# Patient Record
Sex: FEMALE | Race: ASIAN | NOT HISPANIC OR LATINO
[De-identification: names, ages, dates, MRNs, and addresses within clinical notes are randomized per-mention and may not be internally consistent; named-entity substitution may affect disease eponyms.]

---

## 2018-09-01 PROBLEM — Z00.00 ENCOUNTER FOR PREVENTIVE HEALTH EXAMINATION: Status: ACTIVE | Noted: 2018-09-01

## 2018-09-04 ENCOUNTER — APPOINTMENT (OUTPATIENT)
Dept: ANTEPARTUM | Facility: CLINIC | Age: 33
End: 2018-09-04
Payer: MEDICAID

## 2018-09-04 ENCOUNTER — ASOB RESULT (OUTPATIENT)
Age: 33
End: 2018-09-04

## 2018-09-04 PROCEDURE — 76805 OB US >/= 14 WKS SNGL FETUS: CPT

## 2018-09-04 PROCEDURE — 76801 OB US < 14 WKS SINGLE FETUS: CPT

## 2018-10-16 ENCOUNTER — ASOB RESULT (OUTPATIENT)
Age: 33
End: 2018-10-16

## 2018-10-16 ENCOUNTER — APPOINTMENT (OUTPATIENT)
Dept: ANTEPARTUM | Facility: CLINIC | Age: 33
End: 2018-10-16
Payer: MEDICAID

## 2018-10-16 PROCEDURE — 76811 OB US DETAILED SNGL FETUS: CPT

## 2018-10-16 PROCEDURE — 76817 TRANSVAGINAL US OBSTETRIC: CPT

## 2018-12-11 ENCOUNTER — APPOINTMENT (OUTPATIENT)
Dept: ANTEPARTUM | Facility: CLINIC | Age: 33
End: 2018-12-11
Payer: MEDICAID

## 2018-12-11 ENCOUNTER — ASOB RESULT (OUTPATIENT)
Age: 33
End: 2018-12-11

## 2018-12-11 PROCEDURE — 76819 FETAL BIOPHYS PROFIL W/O NST: CPT

## 2018-12-11 PROCEDURE — 76816 OB US FOLLOW-UP PER FETUS: CPT

## 2019-01-22 ENCOUNTER — ASOB RESULT (OUTPATIENT)
Age: 34
End: 2019-01-22

## 2019-01-22 ENCOUNTER — APPOINTMENT (OUTPATIENT)
Dept: ANTEPARTUM | Facility: CLINIC | Age: 34
End: 2019-01-22
Payer: MEDICAID

## 2019-01-22 PROCEDURE — 76819 FETAL BIOPHYS PROFIL W/O NST: CPT

## 2019-01-22 PROCEDURE — 76816 OB US FOLLOW-UP PER FETUS: CPT

## 2019-02-05 ENCOUNTER — TRANSCRIPTION ENCOUNTER (OUTPATIENT)
Age: 34
End: 2019-02-05

## 2019-02-25 ENCOUNTER — APPOINTMENT (OUTPATIENT)
Dept: ANTEPARTUM | Facility: CLINIC | Age: 34
End: 2019-02-25
Payer: MEDICAID

## 2019-02-25 ENCOUNTER — ASOB RESULT (OUTPATIENT)
Age: 34
End: 2019-02-25

## 2019-02-25 PROCEDURE — 76821 MIDDLE CEREBRAL ARTERY ECHO: CPT

## 2019-02-25 PROCEDURE — 76819 FETAL BIOPHYS PROFIL W/O NST: CPT

## 2019-02-25 PROCEDURE — 76816 OB US FOLLOW-UP PER FETUS: CPT

## 2019-02-25 PROCEDURE — 93976 VASCULAR STUDY: CPT

## 2019-02-25 PROCEDURE — 76820 UMBILICAL ARTERY ECHO: CPT

## 2019-02-27 ENCOUNTER — TRANSCRIPTION ENCOUNTER (OUTPATIENT)
Age: 34
End: 2019-02-27

## 2019-02-27 ENCOUNTER — INPATIENT (INPATIENT)
Facility: HOSPITAL | Age: 34
LOS: 1 days | Discharge: HOME | End: 2019-03-01
Attending: OBSTETRICS & GYNECOLOGY | Admitting: OBSTETRICS & GYNECOLOGY
Payer: COMMERCIAL

## 2019-02-27 VITALS
RESPIRATION RATE: 17 BRPM | TEMPERATURE: 98 F | HEART RATE: 83 BPM | SYSTOLIC BLOOD PRESSURE: 124 MMHG | DIASTOLIC BLOOD PRESSURE: 80 MMHG

## 2019-02-27 DIAGNOSIS — O47.1 FALSE LABOR AT OR AFTER 37 COMPLETED WEEKS OF GESTATION: ICD-10-CM

## 2019-02-27 DIAGNOSIS — O36.5930 MATERNAL CARE FOR OTHER KNOWN OR SUSPECTED POOR FETAL GROWTH, THIRD TRIMESTER, NOT APPLICABLE OR UNSPECIFIED: ICD-10-CM

## 2019-02-27 DIAGNOSIS — O26.893 OTHER SPECIFIED PREGNANCY RELATED CONDITIONS, THIRD TRIMESTER: ICD-10-CM

## 2019-02-27 LAB
ABO RH CONFIRMATION: SIGNIFICANT CHANGE UP
BASOPHILS # BLD AUTO: 0 K/UL — SIGNIFICANT CHANGE UP (ref 0–0.2)
BASOPHILS NFR BLD AUTO: 0.1 % — SIGNIFICANT CHANGE UP (ref 0–2)
BLD GP AB SCN SERPL QL: SIGNIFICANT CHANGE UP
EOSINOPHIL # BLD AUTO: 0.2 K/UL — SIGNIFICANT CHANGE UP (ref 0–0.5)
EOSINOPHIL NFR BLD AUTO: 1.8 % — SIGNIFICANT CHANGE UP (ref 0–6)
HCT VFR BLD CALC: 37.3 % — SIGNIFICANT CHANGE UP (ref 37–47)
HGB BLD-MCNC: 12 G/DL — SIGNIFICANT CHANGE UP (ref 12–16)
LYMPHOCYTES # BLD AUTO: 1.7 K/UL — SIGNIFICANT CHANGE UP (ref 1–4.8)
LYMPHOCYTES # BLD AUTO: 16.8 % — LOW (ref 20–55)
MCHC RBC-ENTMCNC: 26.8 PG — LOW (ref 27–31)
MCHC RBC-ENTMCNC: 32.2 G/DL — SIGNIFICANT CHANGE UP (ref 32–36)
MCV RBC AUTO: 83.4 FL — SIGNIFICANT CHANGE UP (ref 81–99)
MONOCYTES # BLD AUTO: 0.7 K/UL — SIGNIFICANT CHANGE UP (ref 0–0.8)
MONOCYTES NFR BLD AUTO: 7.2 % — SIGNIFICANT CHANGE UP (ref 3–10)
NEUTROPHILS # BLD AUTO: 7.4 K/UL — SIGNIFICANT CHANGE UP (ref 1.8–8)
NEUTROPHILS NFR BLD AUTO: 73.9 % — HIGH (ref 37–73)
PLATELET # BLD AUTO: 260 K/UL — SIGNIFICANT CHANGE UP (ref 150–400)
RBC # BLD: 4.47 M/UL — SIGNIFICANT CHANGE UP (ref 4.4–5.2)
RBC # FLD: 13.8 % — SIGNIFICANT CHANGE UP (ref 11–15.6)
TYPE + AB SCN PNL BLD: SIGNIFICANT CHANGE UP
WBC # BLD: 10.1 K/UL — SIGNIFICANT CHANGE UP (ref 4.8–10.8)
WBC # FLD AUTO: 10.1 K/UL — SIGNIFICANT CHANGE UP (ref 4.8–10.8)

## 2019-02-27 RX ORDER — DIPHENHYDRAMINE HCL 50 MG
25 CAPSULE ORAL EVERY 6 HOURS
Qty: 0 | Refills: 0 | Status: DISCONTINUED | OUTPATIENT
Start: 2019-02-27 | End: 2019-03-01

## 2019-02-27 RX ORDER — SODIUM CHLORIDE 9 MG/ML
1000 INJECTION, SOLUTION INTRAVENOUS ONCE
Qty: 0 | Refills: 0 | Status: COMPLETED | OUTPATIENT
Start: 2019-02-27 | End: 2019-02-27

## 2019-02-27 RX ORDER — SODIUM CHLORIDE 9 MG/ML
1000 INJECTION, SOLUTION INTRAVENOUS
Qty: 0 | Refills: 0 | Status: DISCONTINUED | OUTPATIENT
Start: 2019-02-27 | End: 2019-03-01

## 2019-02-27 RX ORDER — LANOLIN
1 OINTMENT (GRAM) TOPICAL EVERY 6 HOURS
Qty: 0 | Refills: 0 | Status: DISCONTINUED | OUTPATIENT
Start: 2019-02-27 | End: 2019-03-01

## 2019-02-27 RX ORDER — IBUPROFEN 200 MG
600 TABLET ORAL EVERY 6 HOURS
Qty: 0 | Refills: 0 | Status: DISCONTINUED | OUTPATIENT
Start: 2019-02-27 | End: 2019-03-01

## 2019-02-27 RX ORDER — OXYTOCIN 10 UNIT/ML
41.67 VIAL (ML) INJECTION
Qty: 20 | Refills: 0 | Status: DISCONTINUED | OUTPATIENT
Start: 2019-02-27 | End: 2019-03-01

## 2019-02-27 RX ORDER — MAGNESIUM HYDROXIDE 400 MG/1
30 TABLET, CHEWABLE ORAL
Qty: 0 | Refills: 0 | Status: DISCONTINUED | OUTPATIENT
Start: 2019-02-27 | End: 2019-03-01

## 2019-02-27 RX ORDER — ONDANSETRON 8 MG/1
4 TABLET, FILM COATED ORAL EVERY 6 HOURS
Qty: 0 | Refills: 0 | Status: DISCONTINUED | OUTPATIENT
Start: 2019-02-27 | End: 2019-02-27

## 2019-02-27 RX ORDER — SIMETHICONE 80 MG/1
80 TABLET, CHEWABLE ORAL EVERY 6 HOURS
Qty: 0 | Refills: 0 | Status: DISCONTINUED | OUTPATIENT
Start: 2019-02-27 | End: 2019-03-01

## 2019-02-27 RX ORDER — AER TRAVELER 0.5 G/1
1 SOLUTION RECTAL; TOPICAL EVERY 4 HOURS
Qty: 0 | Refills: 0 | Status: DISCONTINUED | OUTPATIENT
Start: 2019-02-27 | End: 2019-03-01

## 2019-02-27 RX ORDER — SODIUM CHLORIDE 9 MG/ML
1000 INJECTION, SOLUTION INTRAVENOUS ONCE
Qty: 0 | Refills: 0 | Status: DISCONTINUED | OUTPATIENT
Start: 2019-02-27 | End: 2019-03-01

## 2019-02-27 RX ORDER — OXYTOCIN 10 UNIT/ML
333.33 VIAL (ML) INJECTION
Qty: 20 | Refills: 0 | Status: COMPLETED | OUTPATIENT
Start: 2019-02-27

## 2019-02-27 RX ORDER — SODIUM CHLORIDE 9 MG/ML
3 INJECTION INTRAMUSCULAR; INTRAVENOUS; SUBCUTANEOUS EVERY 8 HOURS
Qty: 0 | Refills: 0 | Status: DISCONTINUED | OUTPATIENT
Start: 2019-02-27 | End: 2019-03-01

## 2019-02-27 RX ORDER — SODIUM CHLORIDE 9 MG/ML
1000 INJECTION, SOLUTION INTRAVENOUS
Qty: 0 | Refills: 0 | Status: DISCONTINUED | OUTPATIENT
Start: 2019-02-27 | End: 2019-02-27

## 2019-02-27 RX ORDER — CITRIC ACID/SODIUM CITRATE 300-500 MG
15 SOLUTION, ORAL ORAL EVERY 4 HOURS
Qty: 0 | Refills: 0 | Status: DISCONTINUED | OUTPATIENT
Start: 2019-02-27 | End: 2019-02-27

## 2019-02-27 RX ORDER — GLYCERIN ADULT
1 SUPPOSITORY, RECTAL RECTAL AT BEDTIME
Qty: 0 | Refills: 0 | Status: DISCONTINUED | OUTPATIENT
Start: 2019-02-27 | End: 2019-03-01

## 2019-02-27 RX ORDER — DOCUSATE SODIUM 100 MG
100 CAPSULE ORAL
Qty: 0 | Refills: 0 | Status: DISCONTINUED | OUTPATIENT
Start: 2019-02-27 | End: 2019-03-01

## 2019-02-27 RX ORDER — HYDROCORTISONE 1 %
1 OINTMENT (GRAM) TOPICAL EVERY 4 HOURS
Qty: 0 | Refills: 0 | Status: DISCONTINUED | OUTPATIENT
Start: 2019-02-27 | End: 2019-03-01

## 2019-02-27 RX ORDER — ACETAMINOPHEN 500 MG
650 TABLET ORAL EVERY 6 HOURS
Qty: 0 | Refills: 0 | Status: DISCONTINUED | OUTPATIENT
Start: 2019-02-27 | End: 2019-03-01

## 2019-02-27 RX ORDER — OXYCODONE AND ACETAMINOPHEN 5; 325 MG/1; MG/1
2 TABLET ORAL EVERY 6 HOURS
Qty: 0 | Refills: 0 | Status: DISCONTINUED | OUTPATIENT
Start: 2019-02-27 | End: 2019-03-01

## 2019-02-27 RX ORDER — TETANUS TOXOID, REDUCED DIPHTHERIA TOXOID AND ACELLULAR PERTUSSIS VACCINE, ADSORBED 5; 2.5; 8; 8; 2.5 [IU]/.5ML; [IU]/.5ML; UG/.5ML; UG/.5ML; UG/.5ML
0.5 SUSPENSION INTRAMUSCULAR ONCE
Qty: 0 | Refills: 0 | Status: DISCONTINUED | OUTPATIENT
Start: 2019-02-27 | End: 2019-03-01

## 2019-02-27 RX ORDER — DIBUCAINE 1 %
1 OINTMENT (GRAM) RECTAL EVERY 4 HOURS
Qty: 0 | Refills: 0 | Status: DISCONTINUED | OUTPATIENT
Start: 2019-02-27 | End: 2019-03-01

## 2019-02-27 RX ORDER — OXYTOCIN 10 UNIT/ML
333.33 VIAL (ML) INJECTION
Qty: 20 | Refills: 0 | Status: DISCONTINUED | OUTPATIENT
Start: 2019-02-27 | End: 2019-03-01

## 2019-02-27 RX ORDER — PRAMOXINE HYDROCHLORIDE 150 MG/15G
1 AEROSOL, FOAM RECTAL EVERY 4 HOURS
Qty: 0 | Refills: 0 | Status: DISCONTINUED | OUTPATIENT
Start: 2019-02-27 | End: 2019-03-01

## 2019-02-27 RX ADMIN — SODIUM CHLORIDE 2000 MILLILITER(S): 9 INJECTION, SOLUTION INTRAVENOUS at 06:50

## 2019-02-27 NOTE — DISCHARGE NOTE OB - ADDITIONAL INSTRUCTIONS
To follow up at [facility] in 4-6 weeks for post partum check up. Diet and activity as tolerated.  Take medication as indicated

## 2019-02-27 NOTE — CHART NOTE - NSCHARTNOTEFT_GEN_A_CORE
Pt is uncomfortable with contractions but does not request pain medication at this time  Vital Signs Last 24 Hrs  T(C): 36.6 (2019 06:39), Max: 36.6 (2019 06:38)  T(F): 97.9 (2019 06:39), Max: 97.9 (2019 06:39)  HR: 70 (2019 13:17) (70 - 83)  BP: 131/65 (2019 13:17) (112/76 - 131/65)  RR: 18 (2019 06:39) (17 - 18)    FETAL HEART RATE: FHT was cat1 before the AROM after the rupure of membranes pt had a prolonged deceleration that lasted 6min, FHT came back to baseline with resuscitative measures, including changing of maternal position, LR bolus, and oxygen mask     [place]: contractions every 3-4 min    CERVICAL EXAM: 6/80/-2, AROM, clear, vertex DEBO    PAIN SCALE (0-10): 6/10 with contractions    IMPRESSION: continue close monitoring       INTERVENTIONS: anticipate  at this time

## 2019-02-27 NOTE — DISCHARGE NOTE OB - PATIENT PORTAL LINK FT
You can access the [facility], offered by [facility], by registering with the following website: [email or web address]

## 2019-02-27 NOTE — OB PROVIDER IHI INDUCTION/AUGMENTATION NOTE - NS_CHECKALL_OBGYN_ALL_OB
Order was written/FHR was reviewed/Induction / Augmentation was discussed/H&P was completed/Contractions pattern was reviewed

## 2019-02-27 NOTE — DISCHARGE NOTE OB - HOSPITAL COURSE
32yo  s/p  day 2 at [id number]. Labor course was without any complications and delivery involved a 1st degree perineal laceration which was successfully repaired. Patient did well in recovery and was transferred to post partum floor. Post partum course was unremarkable. Patient to follow up at [facility] in 6 weeks for postpartum check up. Patient is in medically optimized condition to be discharged home.

## 2019-02-27 NOTE — DISCHARGE NOTE OB - OTHER SIGNIFICANT FINDINGS
Labs:                          11.0   13.6  )-----------( 233      ( 28 Feb 2019 07:25 )             33.8

## 2019-02-27 NOTE — DISCHARGE NOTE OB - PLAN OF CARE
Delivered To follow up at [facility] in 4-6 weeks for post partum check up. Diet and activity as tolerated.  Take medication as indicated

## 2019-02-27 NOTE — DISCHARGE NOTE OB - MATERIALS PROVIDED
Vaccinations/Breastfeeding Mother’s Support Group Information/Guide to Postpartum Care/[place]  Immunization Record/Shaken Baby Prevention Handout/Breastfeeding Guide and Packet/Birth Certificate Instructions/[facility] Hearing Screen Program/[facility]  Screening Program/Breastfeeding Log/Bottle Feeding Log/Back To Sleep Handout

## 2019-02-27 NOTE — OB PROVIDER DELIVERY SUMMARY - NSPROVIDERDELIVERYNOTE_OBGYN_ALL_OB_FT
patient with good maternal pushing effort. vertex delivered in LAURA position followed by atraumatic delivery of the shoulders. delayed cord clamping of 30 seconds was done and pitocin was started. cord gases were collected and immediate skin to skin was initiated. placenta was then delivered spontaneously intact. first degree perineal laceration noted and repaired with 2-0 chromic.  male infant  apgar 9/9

## 2019-02-27 NOTE — CHART NOTE - NSCHARTNOTEFT_GEN_A_CORE
Vital Signs Last 24 Hrs  T(F): 97.9 (27 Feb 2019 06:39), Max: 97.9 (27 Feb 2019 06:39)  HR: 78 (27 Feb 2019 11:25) (71 - 83)  BP: 112/76 (27 Feb 2019 11:25) (112/76 - 124/80)  RR: 18 (27 Feb 2019 06:39) (17 - 18)      FETAL HEART RATE   fetal heart rate reactive with moderate variability and irregular contractions     CERVICAL EXAM:  3/50/-2 on admission    PAIN SCALE (0-10): 5  status post 2 doses of cytotec 20mcg    PLAN:  continue cytotec induction

## 2019-02-27 NOTE — DISCHARGE NOTE OB - CARE PROVIDER_API CALL
Gee Coyle)  Obstetrics and Gynecology  [address] [place] [address]  Phone: [phone]  Fax: [phone]  Follow Up Time:

## 2019-02-27 NOTE — OB PROVIDER H&P - ASSESSMENT
32 y/o  @ 39+3 week    pt presented for elective induction    Pmhx- none  pshx- none  pobhx- 1 ft   Pgynhx- no std's  Social- neg x 3  nkda  meds- prenatal vitamins  fetal heart rate reactive with occasional contractions  cervix 3/50/-3  GBS negative    plan;  admit for labor induction due to IUGR as per mfmf

## 2019-02-27 NOTE — DISCHARGE NOTE OB - MEDICATION SUMMARY - MEDICATIONS TO TAKE
I will START or STAY ON the medications listed below when I get home from the hospital:    ibuprofen 600 mg oral tablet  -- 1 tab(s) by mouth every 6 hours, As needed, Moderate Pain (4 - 6)  -- Indication: For Pain control    acetaminophen 325 mg oral tablet  -- 2 tab(s) by mouth every 6 hours, As needed, Temp greater or equal to 38.5C (101.3F), Mild Pain (1 - 3)  -- Indication: For Mild Pain    Prenatal Multivitamins with Folic Acid 1 mg oral tablet  -- 1 tab(s) by mouth once a day  -- Indication: For Nutrition

## 2019-02-27 NOTE — DISCHARGE NOTE OB - CARE PLAN
Principal Discharge DX:	 (normal spontaneous vaginal delivery)  Goal:	Delivered  Assessment and plan of treatment:	To follow up at [facility] in 4-6 weeks for post partum check up. Diet and activity as tolerated.  Take medication as indicated

## 2019-02-28 ENCOUNTER — RESULT REVIEW (OUTPATIENT)
Age: 34
End: 2019-02-28

## 2019-02-28 LAB
BASOPHILS # BLD AUTO: 0 K/UL — SIGNIFICANT CHANGE UP (ref 0–0.2)
BASOPHILS NFR BLD AUTO: 0.1 % — SIGNIFICANT CHANGE UP (ref 0–2)
EOSINOPHIL # BLD AUTO: 0.2 K/UL — SIGNIFICANT CHANGE UP (ref 0–0.5)
EOSINOPHIL NFR BLD AUTO: 1.1 % — SIGNIFICANT CHANGE UP (ref 0–6)
HCT VFR BLD CALC: 33.8 % — LOW (ref 37–47)
HGB BLD-MCNC: 11 G/DL — LOW (ref 12–16)
LYMPHOCYTES # BLD AUTO: 2.8 K/UL — SIGNIFICANT CHANGE UP (ref 1–4.8)
LYMPHOCYTES # BLD AUTO: 20.4 % — SIGNIFICANT CHANGE UP (ref 20–55)
MCHC RBC-ENTMCNC: 27.4 PG — SIGNIFICANT CHANGE UP (ref 27–31)
MCHC RBC-ENTMCNC: 32.5 G/DL — SIGNIFICANT CHANGE UP (ref 32–36)
MCV RBC AUTO: 84.1 FL — SIGNIFICANT CHANGE UP (ref 81–99)
MONOCYTES # BLD AUTO: 0.8 K/UL — SIGNIFICANT CHANGE UP (ref 0–0.8)
MONOCYTES NFR BLD AUTO: 5.8 % — SIGNIFICANT CHANGE UP (ref 3–10)
NEUTROPHILS # BLD AUTO: 9.9 K/UL — HIGH (ref 1.8–8)
NEUTROPHILS NFR BLD AUTO: 72.3 % — SIGNIFICANT CHANGE UP (ref 37–73)
PLATELET # BLD AUTO: 233 K/UL — SIGNIFICANT CHANGE UP (ref 150–400)
RBC # BLD: 4.02 M/UL — LOW (ref 4.4–5.2)
RBC # FLD: 14 % — SIGNIFICANT CHANGE UP (ref 11–15.6)
T PALLIDUM AB TITR SER: NEGATIVE — SIGNIFICANT CHANGE UP
WBC # BLD: 13.6 K/UL — HIGH (ref 4.8–10.8)
WBC # FLD AUTO: 13.6 K/UL — HIGH (ref 4.8–10.8)

## 2019-02-28 PROCEDURE — 88302 TISSUE EXAM BY PATHOLOGIST: CPT | Mod: 26

## 2019-02-28 RX ORDER — SODIUM CHLORIDE 9 MG/ML
500 INJECTION, SOLUTION INTRAVENOUS ONCE
Qty: 0 | Refills: 0 | Status: DISCONTINUED | OUTPATIENT
Start: 2019-02-28 | End: 2019-03-01

## 2019-02-28 RX ORDER — CITRIC ACID/SODIUM CITRATE 300-500 MG
30 SOLUTION, ORAL ORAL ONCE
Qty: 0 | Refills: 0 | Status: COMPLETED | OUTPATIENT
Start: 2019-02-28 | End: 2019-02-28

## 2019-02-28 RX ADMIN — Medication 1 TABLET(S): at 18:02

## 2019-02-28 RX ADMIN — Medication 30 MILLILITER(S): at 11:30

## 2019-02-28 RX ADMIN — SODIUM CHLORIDE 100 MILLILITER(S): 9 INJECTION, SOLUTION INTRAVENOUS at 07:06

## 2019-02-28 RX ADMIN — Medication 600 MILLIGRAM(S): at 20:46

## 2019-02-28 RX ADMIN — Medication 600 MILLIGRAM(S): at 20:16

## 2019-02-28 NOTE — PROGRESS NOTE ADULT - SUBJECTIVE AND OBJECTIVE BOX
Postpartum Note Vaginal Delivery  Patient is a 34yo  s/p  day 1.  Pt is NPO for bilateral postparttum salpingectomy today.  Subjective:  No acute events overnight.   Patient still has slight vaginal bleeding which is decreasing in amount.   Urinating appropriately.   -BM/+flatus.    Physical exam:  Vital Signs Last 24 Hrs  T(C): 37.1 (2019 20:49), Max: 37.1 (2019 20:49)  T(F): 98.8 (2019 20:49), Max: 98.8 (2019 20:49)  HR: 91 (2019 20:49) (69 - 91)  BP: 113/71 (2019 20:49) (98/57 - 134/74)  RR: 20 (2019 20:49) (18 - 20)    Heart: RRR  Lungs: CTABL  Breast: non tender, not engorged   Abdomen: Soft, nontender, no distension, firm uterine fundus  Ext: No DVT signs, warm extremities    LABS:                        12.0   10.1  )-----------( 260      ( 2019 07:31 )             37.3

## 2019-03-01 VITALS
SYSTOLIC BLOOD PRESSURE: 126 MMHG | TEMPERATURE: 98 F | HEART RATE: 76 BPM | RESPIRATION RATE: 18 BRPM | DIASTOLIC BLOOD PRESSURE: 84 MMHG

## 2019-03-01 PROCEDURE — 59050 FETAL MONITOR W/REPORT: CPT

## 2019-03-01 PROCEDURE — 85027 COMPLETE CBC AUTOMATED: CPT

## 2019-03-01 PROCEDURE — 86900 BLOOD TYPING SEROLOGIC ABO: CPT

## 2019-03-01 PROCEDURE — 86901 BLOOD TYPING SEROLOGIC RH(D): CPT

## 2019-03-01 PROCEDURE — 93970 EXTREMITY STUDY: CPT | Mod: 26

## 2019-03-01 PROCEDURE — 59025 FETAL NON-STRESS TEST: CPT

## 2019-03-01 PROCEDURE — G0463: CPT

## 2019-03-01 PROCEDURE — 36415 COLL VENOUS BLD VENIPUNCTURE: CPT

## 2019-03-01 PROCEDURE — 86850 RBC ANTIBODY SCREEN: CPT

## 2019-03-01 PROCEDURE — 93970 EXTREMITY STUDY: CPT

## 2019-03-01 PROCEDURE — 88302 TISSUE EXAM BY PATHOLOGIST: CPT

## 2019-03-01 PROCEDURE — 86780 TREPONEMA PALLIDUM: CPT

## 2019-03-01 RX ORDER — ACETAMINOPHEN 500 MG
2 TABLET ORAL
Qty: 0 | Refills: 0 | COMMUNITY
Start: 2019-03-01

## 2019-03-01 RX ORDER — IBUPROFEN 200 MG
1 TABLET ORAL
Qty: 120 | Refills: 0 | OUTPATIENT
Start: 2019-03-01 | End: 2019-03-30

## 2019-03-01 NOTE — PROGRESS NOTE ADULT - SUBJECTIVE AND OBJECTIVE BOX
INTERVAL HPI/OVERNIGHT EVENTS:  33y Female s/p Post Partum B/L Salpinjectomy under spinal anesthesia on 02/28/19    Vital Signs Last 24 Hrs  T(C): 36.8 (28 Feb 2019 20:36), Max: 37.2 (28 Feb 2019 13:09)  T(F): 98.2 (28 Feb 2019 20:36), Max: 98.9 (28 Feb 2019 13:09)  HR: 61 (28 Feb 2019 20:36) (60 - 69)  BP: 118/73 (28 Feb 2019 20:36) (101/66 - 126/76)  BP(mean): --  RR: 18 (28 Feb 2019 20:36) (16 - 20)  SpO2: 99% (28 Feb 2019 15:24) (98% - 99%)    Patient seen, doing well, no anesthetic complications or complaints noted or reported.  Pain is controlled.

## 2019-03-01 NOTE — PROGRESS NOTE ADULT - ASSESSMENT
A/P:  Patient is a 34yo  now PPD#2 s/p spontaneous vaginal delivery  -Stable  -Voiding, tolerating PO, bowel function nml   -Advance care as tolerated   -Continue routine postpartum/postoperative care and education.  -Pt encouraged to increase ambulation and PO intake  -D/C day 2 if meeting all expected milestones

## 2019-03-01 NOTE — PROGRESS NOTE ADULT - SUBJECTIVE AND OBJECTIVE BOX
Patient is a 34yo  now PPD#2 s/p spontaneous vaginal delivery    S:    No acute events overnight.  Pt seen and examined at bedside. Pt doing well.  Has no complaints.  Pain well controlled on current regiment.  Pt ambulating, tolerating PO, voiding w/o difficulty, +flatus/-BM.  Pt desires to go home    O:    T(C): 36.8 (19 @ 20:36), Max: 37.2 (19 @ 13:09)  HR: 61 (19 @ 20:36) (60 - 69)  BP: 118/73 (19 @ 20:36) (101/66 - 126/76)  RR: 18 (19 @ 20:36) (16 - 20)  SpO2: 99% (19 @ 15:24) (98% - 99%)    Physical Exam  Breast: NT, non-engorged  Abdomen:  soft, NT, ND, BS+  Uterus:  firm below umbilicus  VE:  expectant lochia  Ext:  NT, nonedematous                          11.0   13.6  )-----------( 233      ( 2019 07:25 )             33.8 Patient is a 32yo  now PPD#2 s/p spontaneous vaginal delivery    S:    No acute events overnight.  Pt seen and examined at bedside. Pt doing well.  Has no complaints.  Pain well controlled on current regiment.  Pt ambulating, tolerating PO, voiding w/o difficulty, +flatus/-BM.  Pt desires to go home    O:    T(C): 36.8 (19 @ 20:36), Max: 37.2 (19 @ 13:09)  HR: 61 (19 @ 20:36) (60 - 69)  BP: 118/73 (19 @ 20:36) (101/66 - 126/76)  RR: 18 (19 @ 20:36) (16 - 20)  SpO2: 99% (19 @ 15:24) (98% - 99%)    Physical Exam  Breast: NT, non-engorged  Abdomen:  soft, NT, ND, BS+, infraumbilical incision closed without evidence of infection  Uterus:  firm below umbilicus  VE:  expectant lochia  Ext:  NT, nonedematous                          11.0   13.6  )-----------( 233      ( 2019 07:25 )             33.8

## 2019-03-05 LAB — SURGICAL PATHOLOGY STUDY: SIGNIFICANT CHANGE UP

## 2021-12-14 NOTE — OB RN PATIENT PROFILE - NS_ADMITDT_OBGYN_ALL_OB_DT
RN called patient back regarding situation. Pt states Oct 23rd she had an accident while at work at [employer]. 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 They sent her to [facility] to have a drug test done which was resulted by [employer] per patient. Patient states she received a call today that her urine PH range was not humanly possible and they state her urine may have been substituted. Patient states she is on a temporary hold and cannot work. Pt denies any drug use and did not tamper with the urine. Pt was told to contact her provider to see about any explanation to this urine sample result. RN informed pt we would need to request a record of the urine result as we cannot see it in the system. RN also asked pt to see if there is a way she can have the sample repeated. Pt contacted [address] [place] [address] who told her that no human explanation to urine sample result and her employer would need to approve having this repeated. RN informed pt will attempt to request records and patient to also work with [address] [place] [address] and employer to request a repeat. Pt verbalized understanding.    27-Feb-2019 06:19

## 2024-05-15 NOTE — OB RN PREOPERATIVE CHECKLIST - TYPE OF SOLUTION
Call with lab results. Big improvement in A1c from 7.3 to 5.3. OK to stop metformin. Fasting glucose is at goal   Cholesterol numbers at goal except for elevated triglycerides at 236.   Kidney function is worse. Be sure to hydrate. Avoid NSAIDs like ibuprofen. Return 100 days    
Pt informed  
RL